# Patient Record
Sex: FEMALE | Race: WHITE
[De-identification: names, ages, dates, MRNs, and addresses within clinical notes are randomized per-mention and may not be internally consistent; named-entity substitution may affect disease eponyms.]

---

## 2018-02-16 ENCOUNTER — HOSPITAL ENCOUNTER (EMERGENCY)
Dept: HOSPITAL 25 - UCCORT | Age: 38
Discharge: HOME | End: 2018-02-16
Payer: COMMERCIAL

## 2018-02-16 VITALS — SYSTOLIC BLOOD PRESSURE: 102 MMHG | DIASTOLIC BLOOD PRESSURE: 68 MMHG
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
## 2018-02-16 DIAGNOSIS — S60.221A: Primary | ICD-10-CM

## 2018-02-16 PROCEDURE — G0463 HOSPITAL OUTPT CLINIC VISIT: HCPCS

## 2018-02-16 PROCEDURE — 99211 OFF/OP EST MAY X REQ PHY/QHP: CPT

## 2018-02-16 NOTE — RAD
HISTORY: Right hand trauma



COMPARISONS: December 20, 2016



VIEWS: 4, Frontal, lateral, and oblique views of the right hand



FINDINGS:



BONE DENSITY: Normal.

BONES: There is no displaced fracture.    

JOINTS: There is mild osteoarthritis of the first MCP joint.    

ALIGNMENT: There is no dislocation. 

SOFT TISSUES: Unremarkable.



OTHER FINDINGS: None.



IMPRESSION: 

OSTEOARTHRITIS. NO ACUTE OSSEOUS INJURY. IF SYMPTOMS PERSIST, RECOMMEND REPEAT IMAGING.

## 2018-02-16 NOTE — ED
Upper Extremity Pain





- HPI Summary


HPI Summary: 





38 yr old female with the complaint of right hand pain.  Onset one week ago.  

She closed her hand in a car door.  She has pain mostly in the 4/5 MP joint 

areas right hand.  No fever, no chills.  





- History of Current Complaint


Chief Complaint: UCUpperExtremity


Stated Complaint: RT HAND INJ


Time Seen by Provider: 02/16/18 10:52


Hx Last Menstrual Period: 02/09/18





- Allergies/Home Medications


Allergies/Adverse Reactions: 


 Allergies











Allergy/AdvReac Type Severity Reaction Status Date / Time


 


codeine Allergy  Itching Verified 02/16/18 10:08


 


hydrocodone Allergy  Itching Verified 02/16/18 10:08


 


morphine Allergy  Itching Verified 02/16/18 10:08


 


oxycodone Allergy  Itching Verified 02/16/18 10:08


 


Penicillins Allergy  Hives Verified 02/16/18 10:08














PMH/Surg Hx/FS Hx/Imm Hx


Respiratory History: Reports: Hx Asthma - as a child





- Surgical History


Surgery Procedure, Year, and Place: tubal ligation, t/a, ear tubes


Infectious Disease History: No


Infectious Disease History: 


   Denies: Traveled Outside the [place] in Last 30 Days





- Family History


Known Family History: Positive: Hypertension





- Social History


Alcohol Use: None


Substance Use Type: Reports: None


Smoking Status (MU): Never Smoked Tobacco





Review of Systems


Constitutional: Negative


Positive: Other - trauma to hand


All Other Systems Reviewed And Are Negative: Yes





Physical Exam


Triage Information Reviewed: Yes


Vital Signs On Initial Exam: 


 Initial Vitals











Temp Pulse Resp BP Pulse Ox


 


 98.9 F   61   16   102/68   100 


 


 02/16/18 10:08  02/16/18 10:08  02/16/18 10:08  02/16/18 10:08  02/16/18 10:08











Vital Signs Reviewed: Yes


Appearance: Positive: Well-Appearing, No Pain Distress


Skin: Positive: Warm, Skin Color Reflects Adequate Perfusion


Head/Face: Positive: Normal Head/Face Inspection


Eyes: Positive: EOMI


Neck: Positive: Nontender


Respiratory/Lung Sounds: Positive: Clear to Auscultation, Breath Sounds Present


Cardiovascular: Positive: Pulses are Symmetrical in both Upper and Lower 

Extremities - she has strong radial pulse right hand and good cap refill digits


Musculoskeletal: Positive: Other - she has tenderness over the 4/5 mp joints 

right hand without much STS and no cellulitis.  there is mild red/bruise color 

over the 5th MP.  ROM at these MPs are decreased.


Neurological: Positive: Sensory/Motor Intact, Alert, Oriented to Person Place, 

Time, CN Intact II-III


Psychiatric: Positive: Normal





- [place] Coma Scale


Best Eye Response: 4 - Spontaneous


Best Motor Response: 6 - Obeys Commands


Best Verbal Response: 5 - Oriented


Coma Scale Total: 15





Diagnostics





- Vital Signs


 Vital Signs











  Temp Pulse Resp BP Pulse Ox


 


 02/16/18 10:08  98.9 F  61  16  102/68  100














- Laboratory


Lab Statement: Any lab studies that have been ordered have been reviewed, and 

results considered in the medical decision making process.





- Radiology


  ** right hand


Xray Interpretation: No Acute Changes


Radiology Interpretation Completed By: Radiologist





Course/Dx





- Course


Course Of Treatment: 38 yr old with neg xray.  DC home. referral to ortho for 

any further questions.





- Diagnoses


Provider Diagnoses: 


 Contusion, hand








Discharge





- Discharge Plan


Condition: Good


Disposition: HOME


Patient Education Materials:  Contusion in Adults (ED)


Referrals: 


No Primary Care Phys,NOPCP [Primary Care Provider] -